# Patient Record
Sex: MALE | Race: WHITE | NOT HISPANIC OR LATINO | Employment: OTHER | URBAN - METROPOLITAN AREA
[De-identification: names, ages, dates, MRNs, and addresses within clinical notes are randomized per-mention and may not be internally consistent; named-entity substitution may affect disease eponyms.]

---

## 2020-06-22 ENCOUNTER — OFFICE VISIT (OUTPATIENT)
Dept: INTERNAL MEDICINE | Facility: CLINIC | Age: 75
End: 2020-06-22

## 2020-06-22 VITALS
RESPIRATION RATE: 18 BRPM | HEART RATE: 109 BPM | OXYGEN SATURATION: 98 % | SYSTOLIC BLOOD PRESSURE: 138 MMHG | DIASTOLIC BLOOD PRESSURE: 66 MMHG | HEIGHT: 73 IN | TEMPERATURE: 96.8 F | BODY MASS INDEX: 27.17 KG/M2 | WEIGHT: 205 LBS

## 2020-06-22 DIAGNOSIS — Z11.1 SCREENING-PULMONARY TB: Primary | ICD-10-CM

## 2020-06-22 DIAGNOSIS — R03.0 ELEVATED BP WITHOUT DIAGNOSIS OF HYPERTENSION: ICD-10-CM

## 2020-06-22 DIAGNOSIS — F19.11 HISTORY OF DRUG ABUSE (HCC): ICD-10-CM

## 2020-06-22 PROCEDURE — 99203 OFFICE O/P NEW LOW 30 MIN: CPT | Performed by: NURSE PRACTITIONER

## 2020-06-22 PROCEDURE — 86480 TB TEST CELL IMMUN MEASURE: CPT | Performed by: NURSE PRACTITIONER

## 2020-06-22 PROCEDURE — 36415 COLL VENOUS BLD VENIPUNCTURE: CPT | Performed by: NURSE PRACTITIONER

## 2020-06-22 NOTE — PROGRESS NOTES
Subjective   James Amos is a 75 y.o. male here to establish care.  Chief Complaint   Patient presents with   • Establish Care     Needs TB test for Assisted Living Facility in [facility]       History of Present Illness     Patient is here today to establish care.  He reports that he is in the process of getting into an assisted living facility in [facility]. He does not recall the name of it at the moment.   He is requesting TB testing prior to admission there.  He reports that he will not be returning here for further care.  He is currently staying with his nephew in [place].  Patient reports that he was visiting prior to the COVID-19 pandemic and then was unable to return home.    He denies cough, shortness of breath, appetite changes diaphoresis, fever, or unintentional weight loss.   [facility]     He does have a history of gastric bypass around 2007 and had problems with addition to roxicet post surgery.   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   Denies current drug use.      The following portions of the patient's history were reviewed and updated as appropriate: allergies, current medications, past family history, past medical history, past social history, past surgical history and problem list.    Review of Systems   Constitutional: Negative for activity change, appetite change, chills, diaphoresis, fatigue, fever, unexpected weight gain and unexpected weight loss.   HENT: Negative.    Eyes: Negative for pain and visual disturbance.   Respiratory: Negative for cough, chest tightness and shortness of breath.    Cardiovascular: Negative for chest pain, palpitations and leg swelling.   Gastrointestinal: Negative for abdominal pain, constipation and diarrhea.   Genitourinary: Negative.  Negative for difficulty urinating, frequency, nocturia and urgency.   Musculoskeletal: Negative.  Negative for arthralgias  "and myalgias.   Skin: Negative for color change and rash.   Neurological: Negative for dizziness, weakness and headache.   Hematological: Does not bruise/bleed easily.           No Known Allergies  Past Medical History:   Diagnosis Date   • Kidney stone    • Urinary tract infection      Past Surgical History:   Procedure Laterality Date   • GASTRIC BYPASS  2007   • KIDNEY STONE SURGERY       Family History   Problem Relation Age of Onset   • Kidney disease Father      Social History     Socioeconomic History   • Marital status: Single     Spouse name: Not on file   • Number of children: Not on file   • Years of education: Not on file   • Highest education level: Not on file   Tobacco Use   • Smoking status: Never Smoker   • Smokeless tobacco: Never Used   Substance and Sexual Activity   • Alcohol use: Never     Frequency: Never   • Drug use: Not Currently     Comment: 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    Working on getting into assisted living in [place].        There is no immunization history on file for this patient.    Current Outpatient Medications:   •  METHADONE HCL PO, Take  by mouth. Takes up to 70 mg, Disp: , Rfl:     Objective   Blood pressure 138/66, pulse 109, temperature 96.8 °F (36 °C), resp. rate 18, height 185.4 cm (73\"), weight 93 kg (205 lb), SpO2 98 %.  Physical Exam   Constitutional: He is oriented to person, place, and time. He appears well-developed and well-nourished. No distress.   HENT:   Head: Normocephalic and atraumatic.   Eyes: Pupils are equal, round, and reactive to light. Conjunctivae are normal.   Neck: Normal range of motion. No JVD present.   Cardiovascular: Normal rate, regular rhythm, normal heart sounds and intact distal pulses.   No murmur heard.  Pulmonary/Chest: Effort normal and breath sounds normal. No respiratory distress. He exhibits no tenderness.   Abdominal: Soft. " Normal appearance and bowel sounds are normal. He exhibits no distension. There is no tenderness.   Musculoskeletal: He exhibits no edema.   Neurological: He is alert and oriented to person, place, and time. Gait (ambulates with walker) normal.   Skin: Skin is warm and dry. He is not diaphoretic. No erythema. No pallor.   Psychiatric: He has a normal mood and affect. His speech is normal and behavior is normal. Judgment and thought content normal.   Nursing note and vitals reviewed.      Assessment/Plan   Diagnoses and all orders for this visit:    1. Screening-pulmonary TB (Primary)  -     QuantiFERON TB Plus Client Incubated; Future  -     QuantiFERON TB Plus Client Incubated  Patient will be moving into assisted living residence in [facility].  Needs TB screening prior to being able to move in.  Patient is asymptomatic and does not appear to have communicable disease.  TB screening sent.  Will notify of results and treat accordingly  2. History of drug abuse (CMS/[facility])  Patient had problems with overuse of Roxicet post gastric bypass around 2007 and is now followed by methadone clinic for methadone prescriptions.  Patient to continue to follow with his methadone clinic.  Encourage drug abstinence.  3.  Elevated BP without diagnosis of hypertension  [facility]         Return for Patient to follow-[facility] with [facility].  Plan of care discussed with pt. They verbalized understanding and agreement.       * Please note that portions of this note were completed with a voice recognition program. Efforts were made to edit the dictation but occasionally words are erroneously transcribed.

## 2020-06-25 LAB
QUANTIFERON CRITERIA: NORMAL
QUANTIFERON MITOGEN VALUE: >10 IU/ML
QUANTIFERON NIL VALUE: 0.02 IU/ML
QUANTIFERON TB1 AG VALUE: 0.02 IU/ML
QUANTIFERON TB2 AG VALUE: 0.01 IU/ML
QUANTIFERON-TB GOLD PLUS: NEGATIVE

## 2022-06-01 LAB
A/G RATIO: 1.1 (ref 1–2.1)
ALBUMIN SERPL-MCNC: 3.9 G/DL (ref 3.5–5)
ALP BLD-CCNC: 102 U/L (ref 33–140)
ALT SERPL-CCNC: 7 U/L (ref 0–60)
ANION GAP SERPL CALCULATED.3IONS-SCNC: 12 MMOL/L (ref 5–13)
AST SERPL-CCNC: 14 U/L (ref 0–40)
BASOPHILS ABSOLUTE: 0 10*3/UL (ref 0–0.2)
BASOPHILS RELATIVE PERCENT: 0.2 %
BILIRUB SERPL-MCNC: 0.2 MG/DL (ref 0.2–1.2)
BUN / CREAT RATIO: 25
BUN BLDV-MCNC: 26 MG/DL (ref 7–25)
CALCIUM SERPL-MCNC: 9 MG/DL (ref 8.8–10.9)
CHLORIDE BLD-SCNC: 94 MMOL/L (ref 98–110)
CO2: 34 MMOL/L (ref 22–29)
CREAT SERPL-MCNC: 1.02 MG/DL (ref 0.5–1.3)
EGFR (CKD-EPI): 76 SEE NOTE
EOSINOPHILS ABSOLUTE: 0.2 10*3/UL (ref 0–0.5)
EOSINOPHILS RELATIVE PERCENT: 1.5 %
GLOBULIN: 3.5 G/DL (ref 2–3.7)
GLUCOSE BLD-MCNC: 101 MG/DL (ref 71–99)
GRANULOCYTE ABSOLUTE COUNT: 7.7 10*3/UL (ref 1.5–7.8)
HCT VFR BLD CALC: 40.1 % (ref 40–51)
HEMOGLOBIN: 12.4 G/DL (ref 13.2–17.1)
HEPATITIS C GENOTYPE: NONREACTIVE
HEPATITIS C VIRUS AB SIGNAL/CU: 0.32 S/CO (ref 0–0.79)
IMMATURE GRANULOCYTES: 0 10*3/UL (ref 0–0.1)
IMMATURE GRANULOCYTES: 0.4 % (ref 0–2)
LYMPHOCYTES ABSOLUTE: 2.4 10*3/UL (ref 0.8–3.9)
LYMPHOCYTES RELATIVE PERCENT: 21 %
MCH RBC QN AUTO: 25.9 PG (ref 27–33)
MCHC RBC AUTO-ENTMCNC: 30.9 G/DL (ref 30–36)
MCV RBC AUTO: 83.7 FL (ref 80–100)
MONOCYTES ABSOLUTE: 1 10*3/UL (ref 0.2–0.9)
MONOCYTES RELATIVE PERCENT: 8.6 %
NUCLEATED RED BLOOD CELLS: 0 /100 WBC (ref 0–0)
PDW BLD-RTO: 15.3 % (ref 11–15)
PLATELET # BLD: 207 10*3/UL (ref 140–400)
PMV BLD AUTO: 10.9 FL (ref 9–13)
POTASSIUM SERPL-SCNC: 3.9 MMOL/L (ref 3.5–5.1)
RBC # BLD: 4.79 10*6/UL (ref 4.2–5.8)
SEGMENTED NEUTROPHILS RELATIVE PERCENT: 68.3 %
SODIUM BLD-SCNC: 140 MMOL/L (ref 135–146)
TOTAL PROTEIN: 7.4 G/DL (ref 6–8)
TSH ULTRASENSITIVE: 1.29 UIU/ML (ref 0.35–4.94)
WBC # BLD: 11.3 10*3/UL (ref 3.8–10.8)

## 2022-06-02 LAB
CHOLESTEROL, TOTAL: 170 MG/DL (ref 125–199)
CHOLESTEROL/HDL RATIO: 3.4 (ref 0–5)
HDLC SERPL-MCNC: 50 MG/DL (ref 40–180)
LDL CHOLESTEROL CALCULATED: 105 MG/DL (ref 0–100)
TRIGL SERPL-MCNC: 76 MG/DL (ref 0–149)

## 2022-07-01 LAB
HCT VFR BLD CALC: 39.5 % (ref 40–51)
HEMOGLOBIN: 12.4 G/DL (ref 13.2–17.1)
IRON: 165 UG/DL (ref 65–175)
MCH RBC QN AUTO: 26.8 PG (ref 27–33)
MCHC RBC AUTO-ENTMCNC: 31.4 G/DL (ref 30–36)
MCV RBC AUTO: 85.5 FL (ref 80–100)
O2 SATURATION: 45 % (ref 20–50)
PDW BLD-RTO: 14.7 % (ref 11–15)
PLATELET # BLD: 183 10*3/UL (ref 140–400)
PMV BLD AUTO: 9.9 FL (ref 9–13)
RBC # BLD: 4.62 10*6/UL (ref 4.2–5.8)
TOTAL IRON BINDING CAPACITY: 366 UG/DL (ref 250–450)
WBC # BLD: 9.4 10*3/UL (ref 3.8–10.8)

## 2022-11-23 LAB
BASOPHILS ABSOLUTE: 0.02 10*3/UL (ref 0–0.2)
BASOPHILS RELATIVE PERCENT: 0.2 %
EOSINOPHILS ABSOLUTE: 0.24 10*3/UL (ref 0–0.5)
EOSINOPHILS RELATIVE PERCENT: 2.7 %
GRANULOCYTE ABSOLUTE COUNT: 5.92 10*3/UL (ref 1.5–7.8)
HCT VFR BLD CALC: 37 % (ref 40–51)
HEMOGLOBIN: 11.5 G/DL (ref 13.2–17.1)
IMMATURE GRANULOCYTES: 0.04 10*3/UL (ref 0–0.1)
IMMATURE GRANULOCYTES: 0.4 % (ref 0–2)
IRON: 64 UG/DL (ref 65–175)
LYMPHOCYTES ABSOLUTE: 1.82 10*3/UL (ref 0.8–3.9)
LYMPHOCYTES RELATIVE PERCENT: 20.5 %
MCH RBC QN AUTO: 27.1 PG (ref 27–33)
MCHC RBC AUTO-ENTMCNC: 31.1 G/DL (ref 30–36)
MCV RBC AUTO: 87.3 FL (ref 80–100)
MONOCYTES ABSOLUTE: 0.85 10*3/UL (ref 0.2–0.9)
MONOCYTES RELATIVE PERCENT: 9.6 %
NUCLEATED RED BLOOD CELLS: 0 /100 WBC (ref 0–0)
O2 SATURATION: 27 % (ref 20–50)
PDW BLD-RTO: 16.7 % (ref 11–15)
PLATELET # BLD: 230 10*3/UL (ref 140–400)
PMV BLD AUTO: 10.5 FL (ref 9–13)
RBC # BLD: 4.24 10*6/UL (ref 4.2–5.8)
SEGMENTED NEUTROPHILS RELATIVE PERCENT: 66.6 %
TOTAL IRON BINDING CAPACITY: 239 UG/DL (ref 250–450)
WBC # BLD: 8.89 10*3/UL (ref 3.8–10.8)